# Patient Record
(demographics unavailable — no encounter records)

---

## 2025-05-28 NOTE — PHYSICAL EXAM
[Normal] : Gait: normal [Raines's Sign] : negative Raines's sign [Pronator Drift] : negative pronator drift [SLR] : negative straight leg raise [de-identified] : 5 out of 5 motor strength, sensation is intact and symmetrical full range of motion flexion extension and rotation, no palpatory tenderness full range of motion of hips knees shoulders and elbows (all four extremities), no atrophy, negative straight leg raise, no pathological reflexes, no swelling, normal ambulation, no apparent distress skin is intact, no palpable lymph nodes, no upper or lower extremity instability, alert and oriented x3 and normal mood. Normal finger-to nose test.  [de-identified] : MRI Cervical 4/17/24  ([facility])  IMPRESSION:    Mid, lower cervical degenerative disc change/disc protrusions. Normal cervical, upper thoracic cord. No significant spinal canal or foraminal stenosis.         CLINICAL INDICATION: Upper back, left shoulder pain x15 years.   MRI OF THE CERVICAL SPINE WITHOUT IV CONTRAST:   TECHNIQUE: Multiplanar multisequence MR imaging of the cervical spine was performed without the administration of intravenous contrast.    COMPARISON: None.   FINDINGS:   ALIGNMENT: No alignment abnormality.   VERTEBRAE:  Vertebral height is maintained. No marrow signal abnormality. The posterior elements are intact.   DISCS:  Punctate annular tear, posterior C3-4 disc. Disc height and signal are largely preserved.   CORD:  No cervical, upper thoracic cord signal abnormality. No intradural abnormality. Normal craniocervical junction.   PARAVERTEBRAL SOFT TISSUES:  No paraspinal abnormality.   EVALUATION OF INDIVIDUAL LEVELS DEMONSTRATES:    C2-3:  No spinal canal or neural foraminal stenosis.   C3-4:  Central C3-4 disc protrusion almost abuts the anterior cord.   C4-5:  No spinal canal or neural foraminal stenosis. C5-6:  No spinal canal or neural foraminal stenosis.   C6-7:  Central right C6-7 disc protrusion. Partial encroachment upon the right anterior subarachnoid space.   C7-T1:  No spinal canal or neural foraminal stenosis. T1-2:   No spinal canal or neural foraminal stenosis. T2-3:   No spinal canal or neural foraminal stenosis.  MRI Lumbar 4/17/24  ([facility])  IMPRESSION:    Lower thoracic, mid lumbar degenerative disc change/disc protrusions.  Lower lumbar degenerative facet disease.   Minimal L3-4 spinal canal narrowing. Marked L4-5 spinal canal stenosis.   Bilateral L4-5 foraminal disc extension without nerve root compression or displacement.   Uterine fundal myomas. Pelvic sonography may further characterize this finding.     CLINICAL INDICATION:  Low back pain, bilateral lower extremity radiculopathy x15 years.   MRI OF THE LUMBAR SPINE WITHOUT IV CONTRAST:   TECHNIQUE: Multiplanar multisequence MRI imaging of the lumbar spine was performed without the administration of intravenous contrast.    COMPARISON:  Lumbar radiographs, 12/22.   FINDINGS:   ALIGNMENT:   Alignment is maintained.   VERTEBRAE:   Vertebral height is maintained. No marrow signal abnormality. The posterior elements are intact.   DISCS:  Disc height and signal are preserved.   CONUS MEDULLARIS AND CAUDA EQUINA: The conus tip is seen at T12-L1. Normal distal cord. No intradural abnormality.   PARAVERTEBRAL SOFT TISSUES AND VISUALIZED RETROPERITONEUM:  No paraspinal abnormality.   EVALUATION OF INDIVIDUAL LEVELS DEMONSTRATES:    T11-12:  No spinal canal or neuroforaminal stenosis.   T12-L1:  Central T12-L1 disc protrusion with minimal anterior thecal sac effacement.   L1-2, L2-3:   No spinal canal or neuroforaminal stenosis.   L3-4:  Minimal central L3-4 disc bulge. Bilateral facet/ligamentum flavum hypertrophy. Short pedicle spinal canal narrowing.   L4-5:  Marked central L4-5 disc bulge. Bilateral ligamentum flavum/facet hypertrophy. Consequent spinal canal narrowing. Bilateral foraminal disc extension abuts the L4 nerve roots.   L5-S1:  No spinal canal or neuroforaminal stenosis.   LIMITED EVALUATION OF UPPER SACRUM AND SACROILIAC JOINTS: Intact partially seen SI joints. Uterine fundal myomas measure up to at least 2 cm in AP dimension

## 2025-05-28 NOTE — HISTORY OF PRESENT ILLNESS
[Stable] : stable [de-identified] : 53 year old female presents for evaluation of B/L lower extremity pain, R>L, and B/L upper extremity pain, L>R, since 2011.  Denies injury. She notes that the pain radiates down the LUE to the elbow, and the leg pain originates at the right buttock and radiates down the leg posteriorly to the ankle. Denies numbness/tingling. Denies weakness. Pain is worse with standing, walking and activity.  She sees a neurologist who prescribes her medications for the pain (she can not recall) - provides temporary relief. She tried PT in January and February which did not help.  S/P LESI in 2024 which helped x 6 months.  Has MRI Cervial and Lumbar done in 2024.  PMHx: DM, HTN  No fever chills sweats nausea vomiting no bowel or bladder dysfunction, no recent weight loss or gain no night pain. This history is in addition to the intake form that I personally reviewed.

## 2025-05-28 NOTE — DISCUSSION/SUMMARY
[de-identified] : L4-5 severe stenosis. Cervical degenerative disc disease. Discussed all options.  Discussed L4-5 laminectomy. Risks of surgery include infection, dural tear, nerve root injury, reherniation, future leg pain, future back pain, retained fragment, hematoma, urinary retention, worsening leg symptoms, foot drop, anesthetic risks, blood transfusion risks, positioning pain, visceral vascular injury, deep vein thrombosis, pulmonary embolus, stroke, unplanned return to OR, and death. All risks were explained not exclusive to the ones mentioned alternatives were discussed and all questions were answered the patient agrees and understands the above and is in complete agreement with the plan.  Pain management.  All options discussed including rest, medicine, home exercise, acupuncture, Chiropractic care, Physical Therapy, Pain management, and last resort surgery. All questions were answered, all alternatives discussed and the patient is in complete agreement with the treatment plan which the patient contributed to and discussed with me through the shared decision making process. Follow-up appointment as instructed. Any issues and the patient will call or come in sooner.